# Patient Record
Sex: FEMALE | Race: WHITE
[De-identification: names, ages, dates, MRNs, and addresses within clinical notes are randomized per-mention and may not be internally consistent; named-entity substitution may affect disease eponyms.]

---

## 2019-04-07 ENCOUNTER — HOSPITAL ENCOUNTER (EMERGENCY)
Dept: HOSPITAL 12 - ER | Age: 84
Discharge: HOME | End: 2019-04-07
Payer: COMMERCIAL

## 2019-04-07 VITALS — DIASTOLIC BLOOD PRESSURE: 89 MMHG | SYSTOLIC BLOOD PRESSURE: 130 MMHG

## 2019-04-07 VITALS — WEIGHT: 102 LBS | BODY MASS INDEX: 20.56 KG/M2 | HEIGHT: 59 IN

## 2019-04-07 DIAGNOSIS — E03.9: ICD-10-CM

## 2019-04-07 DIAGNOSIS — R19.7: Primary | ICD-10-CM

## 2019-04-07 DIAGNOSIS — I48.91: ICD-10-CM

## 2019-04-07 DIAGNOSIS — Z79.899: ICD-10-CM

## 2019-04-07 DIAGNOSIS — Z79.01: ICD-10-CM

## 2019-04-07 LAB
ALP SERPL-CCNC: 58 U/L (ref 50–136)
ALT SERPL W/O P-5'-P-CCNC: 39 U/L (ref 14–59)
AST SERPL-CCNC: 22 U/L (ref 15–37)
BASOPHILS # BLD AUTO: 0.1 K/UL (ref 0–8)
BASOPHILS NFR BLD AUTO: 0.6 % (ref 0–2)
BILIRUB DIRECT SERPL-MCNC: 0.2 MG/DL (ref 0–0.2)
BILIRUB SERPL-MCNC: 0.5 MG/DL (ref 0.2–1)
BUN SERPL-MCNC: 19 MG/DL (ref 7–18)
CHLORIDE SERPL-SCNC: 99 MMOL/L (ref 98–107)
CO2 SERPL-SCNC: 25 MMOL/L (ref 21–32)
CREAT SERPL-MCNC: 0.9 MG/DL (ref 0.6–1.3)
EOSINOPHIL # BLD AUTO: 0.1 K/UL (ref 0–0.7)
EOSINOPHIL NFR BLD AUTO: 0.7 % (ref 0–7)
GLUCOSE SERPL-MCNC: 126 MG/DL (ref 74–106)
HCT VFR BLD AUTO: 39.5 % (ref 31.2–41.9)
HGB BLD-MCNC: 13.1 G/DL (ref 10.9–14.3)
LIPASE SERPL-CCNC: 516 U/L (ref 73–393)
LYMPHOCYTES # BLD AUTO: 1 K/UL (ref 20–40)
LYMPHOCYTES NFR BLD AUTO: 10.3 % (ref 20.5–51.5)
MCH RBC QN AUTO: 29.6 UUG (ref 24.7–32.8)
MCHC RBC AUTO-ENTMCNC: 33 G/DL (ref 32.3–35.6)
MCV RBC AUTO: 89.8 FL (ref 75.5–95.3)
MONOCYTES # BLD AUTO: 1 K/UL (ref 2–10)
MONOCYTES NFR BLD AUTO: 11 % (ref 0–11)
NEUTROPHILS # BLD AUTO: 7.4 K/UL (ref 1.8–8.9)
NEUTROPHILS NFR BLD AUTO: 77.4 % (ref 38.5–71.5)
PLATELET # BLD AUTO: 213 K/UL (ref 179–408)
POTASSIUM SERPL-SCNC: 3.8 MMOL/L (ref 3.5–5.1)
RBC # BLD AUTO: 4.4 MIL/UL (ref 3.63–4.92)
WBC # BLD AUTO: 9.5 K/UL (ref 3.8–11.8)
WS STN SPEC: 6.2 G/DL (ref 6.4–8.2)

## 2019-04-07 PROCEDURE — A4663 DIALYSIS BLOOD PRESSURE CUFF: HCPCS

## 2019-04-07 NOTE — NUR
Patient bib RA. Patient AAOX4. Speech clear, speaks in complete sentences. No 
neuro deficits noted. Patient brought in for c/o diarrhea for a few days now. 
Patient has hx of SIBO (small intestinal bacterial overgrowth). Denies any 
vomiting or nausea.  Respiratory even and unlabored, no cough, no sob. No 
cardiovascular distress noted.



Patient in bed lowest position, sr up x2, call light within reach. Patient 
accompanied by son at bedside. Fall precautions implemented per protocol.

## 2021-02-11 ENCOUNTER — HOSPITAL ENCOUNTER (EMERGENCY)
Dept: HOSPITAL 12 - ER | Age: 86
Discharge: ANOTHER HOSPITAL | End: 2021-02-11
Payer: COMMERCIAL

## 2021-02-11 VITALS — DIASTOLIC BLOOD PRESSURE: 74 MMHG | SYSTOLIC BLOOD PRESSURE: 128 MMHG

## 2021-02-11 VITALS — HEIGHT: 60 IN | WEIGHT: 105 LBS | BODY MASS INDEX: 20.62 KG/M2

## 2021-02-11 DIAGNOSIS — E03.9: ICD-10-CM

## 2021-02-11 DIAGNOSIS — Z20.822: ICD-10-CM

## 2021-02-11 DIAGNOSIS — I67.1: ICD-10-CM

## 2021-02-11 DIAGNOSIS — M54.2: ICD-10-CM

## 2021-02-11 DIAGNOSIS — Z79.01: ICD-10-CM

## 2021-02-11 DIAGNOSIS — R09.02: ICD-10-CM

## 2021-02-11 DIAGNOSIS — J69.0: Primary | ICD-10-CM

## 2021-02-11 DIAGNOSIS — R79.1: ICD-10-CM

## 2021-02-11 DIAGNOSIS — I48.91: ICD-10-CM

## 2021-02-11 LAB
BASOPHILS # BLD AUTO: 0.1 K/UL (ref 0–8)
BASOPHILS NFR BLD AUTO: 0.8 % (ref 0–2)
BUN SERPL-MCNC: 23 MG/DL (ref 7–18)
CHLORIDE SERPL-SCNC: 101 MMOL/L (ref 98–107)
CO2 SERPL-SCNC: 26 MMOL/L (ref 21–32)
CREAT SERPL-MCNC: 0.9 MG/DL (ref 0.6–1.3)
EOSINOPHIL # BLD AUTO: 0.1 K/UL (ref 0–0.7)
EOSINOPHIL NFR BLD AUTO: 1.2 % (ref 0–7)
GLUCOSE SERPL-MCNC: 133 MG/DL (ref 74–106)
HCT VFR BLD AUTO: 42.6 % (ref 31.2–41.9)
HGB BLD-MCNC: 13.9 G/DL (ref 10.9–14.3)
LYMPHOCYTES # BLD AUTO: 1.9 K/UL (ref 20–40)
LYMPHOCYTES NFR BLD AUTO: 22 % (ref 20.5–51.5)
MCH RBC QN AUTO: 29.7 UUG (ref 24.7–32.8)
MCHC RBC AUTO-ENTMCNC: 33 G/DL (ref 32.3–35.6)
MCV RBC AUTO: 90.8 FL (ref 75.5–95.3)
MONOCYTES # BLD AUTO: 0.8 K/UL (ref 2–10)
MONOCYTES NFR BLD AUTO: 8.8 % (ref 0–11)
NEUTROPHILS # BLD AUTO: 5.9 K/UL (ref 1.8–8.9)
NEUTROPHILS NFR BLD AUTO: 67.2 % (ref 38.5–71.5)
PLATELET # BLD AUTO: 227 K/UL (ref 179–408)
POTASSIUM SERPL-SCNC: 3.9 MMOL/L (ref 3.5–5.1)
RBC # BLD AUTO: 4.69 MIL/UL (ref 3.63–4.92)
WBC # BLD AUTO: 8.8 K/UL (ref 3.8–11.8)

## 2021-02-11 PROCEDURE — 85730 THROMBOPLASTIN TIME PARTIAL: CPT

## 2021-02-11 PROCEDURE — 85651 RBC SED RATE NONAUTOMATED: CPT

## 2021-02-11 PROCEDURE — 99285 EMERGENCY DEPT VISIT HI MDM: CPT

## 2021-02-11 PROCEDURE — 96375 TX/PRO/DX INJ NEW DRUG ADDON: CPT

## 2021-02-11 PROCEDURE — C9132 KCENTRA, PER I.U.: HCPCS

## 2021-02-11 PROCEDURE — 96361 HYDRATE IV INFUSION ADD-ON: CPT

## 2021-02-11 PROCEDURE — 70498 CT ANGIOGRAPHY NECK: CPT

## 2021-02-11 PROCEDURE — 85025 COMPLETE CBC W/AUTO DIFF WBC: CPT

## 2021-02-11 PROCEDURE — 36415 COLL VENOUS BLD VENIPUNCTURE: CPT

## 2021-02-11 PROCEDURE — 70496 CT ANGIOGRAPHY HEAD: CPT

## 2021-02-11 PROCEDURE — 96365 THER/PROPH/DIAG IV INF INIT: CPT

## 2021-02-11 PROCEDURE — 71045 X-RAY EXAM CHEST 1 VIEW: CPT

## 2021-02-11 PROCEDURE — 80048 BASIC METABOLIC PNL TOTAL CA: CPT

## 2021-02-11 PROCEDURE — 96372 THER/PROPH/DIAG INJ SC/IM: CPT

## 2021-02-11 PROCEDURE — 87426 SARSCOV CORONAVIRUS AG IA: CPT

## 2021-02-11 PROCEDURE — 84484 ASSAY OF TROPONIN QUANT: CPT

## 2021-02-11 NOTE — NUR
LEXII from [facility] [phone], called regarding covid test results. Lab has not 
processed the test, awaiting to be run. Yes

## 2021-02-11 NOTE — NUR
Per EPRVINH Lam, patient will be transferred to another facility.



Facility:[facility]

Accepting Physician: Dr. Persaud

Floor: ICU

Bed#: 5104

Phone number for report: [phone]



Transport-[facility] PRN ETA [id number]

## 2021-02-11 NOTE — NUR
Patients BP was 141/82 -- verified with Dr. Braulio Back if Labetalol is still 
needed to be administered - was told to administer and keep BP <120 Systolic. TrumpIT EVENT MONITOR STRIPS RECEIVED    Date/time: 5/12/19 at 0556.   Patient reported: Automatic event, no sxs.   Rhythm: Atrial fibrillation/flutter HR 60 bpm.     Date/time: 5/12/19 at 1545.   Patient reported: Automatic event, no sxs.   Rhythm: Atrial fibrillation/flutter w/ PVC,  bpm.     Strips filed. Will continue to monitor.   William OBRIEN

## 2021-02-11 NOTE — NUR
Order needed for [facility]. Not availale in our Medication Order Sets. Contacted 
night time pharmacy and was told to contact house supervisor to be able to get 
medicatio nfrom on call pharmacist.

## 2021-02-11 NOTE — NUR
Called house supervisor, SHANNON Nesbitt for request to reach out to o/c 
pharmacist. Patient is in need of 4 [facility] prior to transport. 
According to Delicia, o/c pharmacist will come in to assist with medication 
dispense.